# Patient Record
Sex: FEMALE | Race: WHITE | URBAN - METROPOLITAN AREA
[De-identification: names, ages, dates, MRNs, and addresses within clinical notes are randomized per-mention and may not be internally consistent; named-entity substitution may affect disease eponyms.]

---

## 2017-01-25 ENCOUNTER — HISTORICAL (OUTPATIENT)
Dept: LAB | Facility: HOSPITAL | Age: 72
End: 2017-01-25

## 2017-03-09 ENCOUNTER — HISTORICAL (OUTPATIENT)
Dept: ADMINISTRATIVE | Facility: HOSPITAL | Age: 72
End: 2017-03-09

## 2017-04-03 ENCOUNTER — HISTORICAL (OUTPATIENT)
Dept: LAB | Facility: HOSPITAL | Age: 72
End: 2017-04-03

## 2017-05-11 ENCOUNTER — HISTORICAL (OUTPATIENT)
Dept: LAB | Facility: HOSPITAL | Age: 72
End: 2017-05-11

## 2017-05-11 LAB
ABS NEUT (OLG): 4.2 X10(3)/MCL (ref 1.5–6.9)
ALBUMIN SERPL-MCNC: 3.7 GM/DL (ref 3.4–5)
ALBUMIN/GLOB SERPL: 1 RATIO
ALP SERPL-CCNC: 58 UNIT/L (ref 30–113)
ALT SERPL-CCNC: 31 UNIT/L (ref 10–45)
AST SERPL-CCNC: 24 UNIT/L (ref 15–37)
BASOPHILS # BLD AUTO: 0.3 X10(3)/MCL (ref 0–0.1)
BASOPHILS NFR BLD AUTO: 3 % (ref 0–1)
BILIRUB SERPL-MCNC: 0.4 MG/DL (ref 0.1–0.9)
BILIRUBIN DIRECT+TOT PNL SERPL-MCNC: 0.1 MG/DL (ref 0–0.3)
BILIRUBIN DIRECT+TOT PNL SERPL-MCNC: 0.3 MG/DL
BUN SERPL-MCNC: 18 MG/DL (ref 10–20)
CALCIUM SERPL-MCNC: 8.8 MG/DL (ref 8–10.5)
CHLORIDE SERPL-SCNC: 106 MMOL/L (ref 100–108)
CO2 SERPL-SCNC: 30 MMOL/L (ref 21–35)
CREAT SERPL-MCNC: 1.24 MG/DL (ref 0.7–1.3)
EOSINOPHIL # BLD AUTO: 0.7 X10(3)/MCL (ref 0–0.6)
EOSINOPHIL NFR BLD AUTO: 8 % (ref 0–5)
ERYTHROCYTE [DISTWIDTH] IN BLOOD BY AUTOMATED COUNT: 13.1 % (ref 11.5–17)
GLOBULIN SER-MCNC: 3.7 GM/DL
GLUCOSE SERPL-MCNC: 127 MG/DL (ref 75–116)
HCT VFR BLD AUTO: 37.4 % (ref 36–48)
HGB BLD-MCNC: 12.1 GM/DL (ref 12–16)
LYMPHOCYTES # BLD AUTO: 3.4 X10(3)/MCL (ref 0.5–4.1)
LYMPHOCYTES NFR BLD AUTO: 34.4 % (ref 15–40)
MCH RBC QN AUTO: 30 PG (ref 27–34)
MCHC RBC AUTO-ENTMCNC: 32 GM/DL (ref 31–36)
MCV RBC AUTO: 92 FL (ref 80–99)
MONOCYTES # BLD AUTO: 1.2 X10(3)/MCL (ref 0–1.1)
MONOCYTES NFR BLD AUTO: 12 % (ref 4–12)
NEUTROPHILS # BLD AUTO: 4.2 X10(3)/MCL (ref 1.5–6.9)
NEUTROPHILS NFR BLD AUTO: 43 % (ref 43–75)
PLATELET # BLD AUTO: 286 X10(3)/MCL (ref 140–400)
PMV BLD AUTO: 11.6 FL (ref 6.8–10)
POTASSIUM SERPL-SCNC: 4.6 MMOL/L (ref 3.6–5.2)
PROT SERPL-MCNC: 7.4 GM/DL (ref 6.4–8.2)
RBC # BLD AUTO: 4.06 X10(6)/MCL (ref 4.2–5.4)
SODIUM SERPL-SCNC: 142 MMOL/L (ref 135–145)
WBC # SPEC AUTO: 9.9 X10(3)/MCL (ref 4.5–11.5)

## 2017-06-08 ENCOUNTER — HISTORICAL (OUTPATIENT)
Dept: RADIOLOGY | Facility: HOSPITAL | Age: 72
End: 2017-06-08

## 2017-09-01 ENCOUNTER — HISTORICAL (OUTPATIENT)
Dept: LAB | Facility: HOSPITAL | Age: 72
End: 2017-09-01

## 2017-09-01 LAB
ABS NEUT (OLG): 4 X10(3)/MCL (ref 1.5–6.9)
ALBUMIN SERPL-MCNC: 3.4 GM/DL (ref 3.4–5)
BUN SERPL-MCNC: 19 MG/DL (ref 10–20)
CALCIUM SERPL-MCNC: 8.5 MG/DL (ref 8–10.5)
CHLORIDE SERPL-SCNC: 106 MMOL/L (ref 100–108)
CHOLEST SERPL-MCNC: 109 MG/DL (ref 140–200)
CHOLEST/HDLC SERPL: 2 MG/DL (ref 0–8)
CO2 SERPL-SCNC: 28 MMOL/L (ref 21–35)
CREAT SERPL-MCNC: 1.07 MG/DL (ref 0.7–1.3)
ERYTHROCYTE [DISTWIDTH] IN BLOOD BY AUTOMATED COUNT: 12.9 % (ref 11.5–17)
GLUCOSE SERPL-MCNC: 115 MG/DL (ref 75–116)
HCT VFR BLD AUTO: 38.1 % (ref 36–48)
HDLC SERPL-MCNC: 57 MG/DL (ref 35–59)
HGB BLD-MCNC: 12.4 GM/DL (ref 12–16)
LDLC SERPL CALC-MCNC: 45 MG/DL (ref 100–129)
MCH RBC QN AUTO: 30 PG (ref 27–34)
MCHC RBC AUTO-ENTMCNC: 32 GM/DL (ref 31–36)
MCV RBC AUTO: 92 FL (ref 80–99)
PHOSPHATE SERPL-MCNC: 3.6 MG/DL (ref 2.6–4.7)
PLATELET # BLD AUTO: 278 X10(3)/MCL (ref 140–400)
PMV BLD AUTO: 12.2 FL (ref 6.8–10)
POTASSIUM SERPL-SCNC: 3.8 MMOL/L (ref 3.6–5.2)
RBC # BLD AUTO: 4.13 X10(6)/MCL (ref 4.2–5.4)
SODIUM SERPL-SCNC: 143 MMOL/L (ref 135–145)
TRIGL SERPL-MCNC: 48 MG/DL (ref 35–150)
TSH SERPL-ACNC: 2.82 MIU/ML (ref 0.36–3.74)
VLDLC SERPL CALC-MCNC: 10 MG/DL
WBC # SPEC AUTO: 9.5 X10(3)/MCL (ref 4.5–11.5)

## 2017-11-07 ENCOUNTER — HISTORICAL (OUTPATIENT)
Dept: LAB | Facility: HOSPITAL | Age: 72
End: 2017-11-07

## 2017-11-07 LAB
ABS NEUT (OLG): 5.5 X10(3)/MCL (ref 1.5–6.9)
ALBUMIN SERPL-MCNC: 3.4 GM/DL (ref 3.4–5)
ALBUMIN/GLOB SERPL: 0.8 RATIO
ALP SERPL-CCNC: 70 UNIT/L (ref 30–113)
ALT SERPL-CCNC: 31 UNIT/L (ref 10–45)
AST SERPL-CCNC: 29 UNIT/L (ref 15–37)
BILIRUB SERPL-MCNC: 0.3 MG/DL (ref 0.1–0.9)
BILIRUBIN DIRECT+TOT PNL SERPL-MCNC: 0.1 MG/DL (ref 0–0.3)
BILIRUBIN DIRECT+TOT PNL SERPL-MCNC: 0.2 MG/DL
BUN SERPL-MCNC: 20 MG/DL (ref 10–20)
CALCIUM SERPL-MCNC: 9.1 MG/DL (ref 8–10.5)
CHLORIDE SERPL-SCNC: 107 MMOL/L (ref 100–108)
CHOLEST SERPL-MCNC: 108 MG/DL (ref 140–200)
CHOLEST/HDLC SERPL: 2 MG/DL (ref 0–8)
CO2 SERPL-SCNC: 30 MMOL/L (ref 21–35)
CREAT SERPL-MCNC: 1.25 MG/DL (ref 0.7–1.3)
ERYTHROCYTE [DISTWIDTH] IN BLOOD BY AUTOMATED COUNT: 13 % (ref 11.5–17)
EST. AVERAGE GLUCOSE BLD GHB EST-MCNC: 126 MG/DL
GLOBULIN SER-MCNC: 4 GM/DL
GLUCOSE SERPL-MCNC: 131 MG/DL (ref 75–116)
HBA1C MFR BLD: 6 % (ref 4.8–6)
HCT VFR BLD AUTO: 36.7 % (ref 36–48)
HDLC SERPL-MCNC: 57 MG/DL (ref 35–59)
HGB BLD-MCNC: 11.8 GM/DL (ref 12–16)
LDLC SERPL CALC-MCNC: 44 MG/DL (ref 100–129)
MCH RBC QN AUTO: 30 PG (ref 27–34)
MCHC RBC AUTO-ENTMCNC: 32 GM/DL (ref 31–36)
MCV RBC AUTO: 92 FL (ref 80–99)
PLATELET # BLD AUTO: 294 X10(3)/MCL (ref 140–400)
PMV BLD AUTO: 12 FL (ref 6.8–10)
POTASSIUM SERPL-SCNC: 4.5 MMOL/L (ref 3.6–5.2)
PROT SERPL-MCNC: 7.4 GM/DL (ref 6.4–8.2)
RBC # BLD AUTO: 3.97 X10(6)/MCL (ref 4.2–5.4)
SODIUM SERPL-SCNC: 143 MMOL/L (ref 135–145)
T3FREE SERPL-MCNC: 2.57 PG/ML (ref 2.18–3.98)
TRIGL SERPL-MCNC: 80 MG/DL (ref 35–150)
TSH SERPL-ACNC: 3.27 MIU/ML (ref 0.36–3.74)
VLDLC SERPL CALC-MCNC: 16 MG/DL
WBC # SPEC AUTO: 10.9 X10(3)/MCL (ref 4.5–11.5)

## 2018-02-07 ENCOUNTER — HISTORICAL (OUTPATIENT)
Dept: LAB | Facility: HOSPITAL | Age: 73
End: 2018-02-07

## 2018-02-07 LAB
ALBUMIN SERPL-MCNC: 3.5 GM/DL (ref 3.4–5)
BUN SERPL-MCNC: 24 MG/DL (ref 10–20)
CALCIUM SERPL-MCNC: 9.5 MG/DL (ref 8–10.5)
CHLORIDE SERPL-SCNC: 107 MMOL/L (ref 100–108)
CO2 SERPL-SCNC: 30 MMOL/L (ref 21–35)
CREAT SERPL-MCNC: 1.18 MG/DL (ref 0.7–1.3)
CREAT UR-MCNC: 80.8 MG/DL
GLUCOSE SERPL-MCNC: 119 MG/DL (ref 75–116)
PHOSPHATE SERPL-MCNC: 3.8 MG/DL (ref 2.6–4.7)
POTASSIUM SERPL-SCNC: 4.1 MMOL/L (ref 3.6–5.2)
PROT UR STRIP-MCNC: 14 MG/DL (ref 0–10)
SODIUM SERPL-SCNC: 144 MMOL/L (ref 135–145)

## 2018-03-08 ENCOUNTER — HISTORICAL (OUTPATIENT)
Dept: LAB | Facility: HOSPITAL | Age: 73
End: 2018-03-08

## 2018-03-08 LAB
ABS NEUT (OLG): 3.9 X10(3)/MCL (ref 1.5–6.9)
ALBUMIN SERPL-MCNC: 3.6 GM/DL (ref 3.4–5)
ALBUMIN/GLOB SERPL: 0.9 RATIO
ALP SERPL-CCNC: 54 UNIT/L (ref 30–113)
ALT SERPL-CCNC: 28 UNIT/L (ref 10–45)
APPEARANCE, UA: CLEAR
AST SERPL-CCNC: 29 UNIT/L (ref 15–37)
BACTERIA SPEC CULT: ABNORMAL /HPF
BILIRUB SERPL-MCNC: 0.3 MG/DL (ref 0.1–0.9)
BILIRUB UR QL STRIP: NEGATIVE
BILIRUBIN DIRECT+TOT PNL SERPL-MCNC: 0.1 MG/DL (ref 0–0.3)
BILIRUBIN DIRECT+TOT PNL SERPL-MCNC: 0.2 MG/DL
BUN SERPL-MCNC: 24 MG/DL (ref 10–20)
CALCIUM SERPL-MCNC: 9.1 MG/DL (ref 8–10.5)
CHLORIDE SERPL-SCNC: 106 MMOL/L (ref 100–108)
CO2 SERPL-SCNC: 29 MMOL/L (ref 21–35)
COLOR UR: ABNORMAL
CREAT SERPL-MCNC: 1.05 MG/DL (ref 0.7–1.3)
CREAT UR-MCNC: 88.2 MG/DL
ERYTHROCYTE [DISTWIDTH] IN BLOOD BY AUTOMATED COUNT: 12.9 % (ref 11.5–17)
FERRITIN SERPL-MCNC: 387 NG/ML (ref 3–252)
FOLATE SERPL-MCNC: 20 NG/ML (ref 8.6–58.9)
GLOBULIN SER-MCNC: 4 GM/DL
GLUCOSE (UA): NEGATIVE
GLUCOSE SERPL-MCNC: 108 MG/DL (ref 75–116)
HCT VFR BLD AUTO: 37.4 % (ref 36–48)
HGB BLD-MCNC: 11.8 GM/DL (ref 12–16)
HGB UR QL STRIP: NEGATIVE
IRON SATN MFR SERPL: 30 % (ref 15–55)
IRON SERPL-MCNC: 71 MCG/DL (ref 50–170)
KETONES UR QL STRIP: NEGATIVE
LEUKOCYTE ESTERASE UR QL STRIP: ABNORMAL
MCH RBC QN AUTO: 30 PG (ref 27–34)
MCHC RBC AUTO-ENTMCNC: 32 GM/DL (ref 31–36)
MCV RBC AUTO: 94 FL (ref 80–99)
MICROALBUMIN UR-MCNC: 1.1 MG/DL (ref 0.1–2)
MICROALBUMIN/CREAT RATIO PNL UR: 12.5 MG/GM CR (ref 0–30)
NITRITE UR QL STRIP: NEGATIVE
PH UR STRIP: 6 [PH]
PLATELET # BLD AUTO: 287 X10(3)/MCL (ref 140–400)
PMV BLD AUTO: 12.2 FL (ref 6.8–10)
POTASSIUM SERPL-SCNC: 3.9 MMOL/L (ref 3.6–5.2)
PROT SERPL-MCNC: 7.6 GM/DL (ref 6.4–8.2)
PROT UR QL STRIP: NEGATIVE
PROT UR STRIP-MCNC: 17 MG/DL (ref 0–10)
RBC # BLD AUTO: 3.98 X10(6)/MCL (ref 4.2–5.4)
RBC #/AREA URNS HPF: ABNORMAL /HPF
SODIUM SERPL-SCNC: 144 MMOL/L (ref 135–145)
SP GR UR STRIP: 1.02
SQUAMOUS EPITHELIAL, UA: ABNORMAL /LPF
T3FREE SERPL-MCNC: 2.93 PG/ML (ref 2.18–3.98)
TIBC SERPL-MCNC: 163 MCG/DL (ref 150–375)
TIBC SERPL-MCNC: 234 MCG/DL (ref 250–450)
TSH SERPL-ACNC: 3.28 MIU/ML (ref 0.36–3.74)
UROBILINOGEN UR STRIP-ACNC: 0.2 EU/DL
VIT B12 SERPL-MCNC: 1165 PG/ML (ref 193–986)
WBC # SPEC AUTO: 10.4 X10(3)/MCL (ref 4.5–11.5)
WBC #/AREA URNS HPF: ABNORMAL /HPF

## 2018-07-16 ENCOUNTER — HISTORICAL (OUTPATIENT)
Dept: LAB | Facility: HOSPITAL | Age: 73
End: 2018-07-16

## 2018-07-16 LAB
ABS NEUT (OLG): 4.1 X10(3)/MCL (ref 1.5–6.9)
ALBUMIN SERPL-MCNC: 3.6 GM/DL (ref 3.4–5)
BUN SERPL-MCNC: 33 MG/DL (ref 10–20)
CALCIUM SERPL-MCNC: 8.9 MG/DL (ref 8–10.5)
CHLORIDE SERPL-SCNC: 107 MMOL/L (ref 100–108)
CO2 SERPL-SCNC: 26 MMOL/L (ref 21–35)
CREAT SERPL-MCNC: 1.35 MG/DL (ref 0.7–1.3)
CREAT UR-MCNC: 109.7 MG/DL
ERYTHROCYTE [DISTWIDTH] IN BLOOD BY AUTOMATED COUNT: 12.8 % (ref 11.5–17)
GLUCOSE SERPL-MCNC: 113 MG/DL (ref 75–116)
HCT VFR BLD AUTO: 37.4 % (ref 36–48)
HGB BLD-MCNC: 11.9 GM/DL (ref 12–16)
MCH RBC QN AUTO: 30 PG (ref 27–34)
MCHC RBC AUTO-ENTMCNC: 32 GM/DL (ref 31–36)
MCV RBC AUTO: 94 FL (ref 80–99)
PHOSPHATE SERPL-MCNC: 3.5 MG/DL (ref 2.6–4.7)
PLATELET # BLD AUTO: 278 X10(3)/MCL (ref 140–400)
PMV BLD AUTO: 12.5 FL (ref 6.8–10)
POTASSIUM SERPL-SCNC: 3.8 MMOL/L (ref 3.6–5.2)
PROT UR STRIP-MCNC: 22 MG/DL (ref 0–10)
RBC # BLD AUTO: 3.99 X10(6)/MCL (ref 4.2–5.4)
SODIUM SERPL-SCNC: 142 MMOL/L (ref 135–145)
WBC # SPEC AUTO: 10.6 X10(3)/MCL (ref 4.5–11.5)

## 2018-11-29 ENCOUNTER — HISTORICAL (OUTPATIENT)
Dept: LAB | Facility: HOSPITAL | Age: 73
End: 2018-11-29

## 2018-11-29 LAB
ABS NEUT (OLG): 4 X10(3)/MCL (ref 1.5–6.9)
ALBUMIN SERPL-MCNC: 3.5 GM/DL (ref 3.4–5)
BUN SERPL-MCNC: 23 MG/DL (ref 10–20)
CALCIUM SERPL-MCNC: 9.2 MG/DL (ref 8–10.5)
CHLORIDE SERPL-SCNC: 103 MMOL/L (ref 100–108)
CO2 SERPL-SCNC: 28 MMOL/L (ref 21–35)
CREAT SERPL-MCNC: 1.17 MG/DL (ref 0.7–1.3)
CREAT UR-MCNC: 47.3 MG/DL
ERYTHROCYTE [DISTWIDTH] IN BLOOD BY AUTOMATED COUNT: 13.2 % (ref 11.5–17)
GLUCOSE SERPL-MCNC: 104 MG/DL (ref 75–116)
HCT VFR BLD AUTO: 35.8 % (ref 36–48)
HGB BLD-MCNC: 11.3 GM/DL (ref 12–16)
MCH RBC QN AUTO: 30 PG (ref 27–34)
MCHC RBC AUTO-ENTMCNC: 32 GM/DL (ref 31–36)
MCV RBC AUTO: 95 FL (ref 80–99)
PHOSPHATE SERPL-MCNC: 3.8 MG/DL (ref 2.6–4.7)
PLATELET # BLD AUTO: 309 X10(3)/MCL (ref 140–400)
PMV BLD AUTO: 12 FL (ref 6.8–10)
POTASSIUM SERPL-SCNC: 4.5 MMOL/L (ref 3.6–5.2)
PROT UR STRIP-MCNC: 12 MG/DL (ref 0–10)
RBC # BLD AUTO: 3.77 X10(6)/MCL (ref 4.2–5.4)
SODIUM SERPL-SCNC: 138 MMOL/L (ref 135–145)
WBC # SPEC AUTO: 10 X10(3)/MCL (ref 4.5–11.5)

## 2019-09-17 ENCOUNTER — HISTORICAL (OUTPATIENT)
Dept: LAB | Facility: HOSPITAL | Age: 74
End: 2019-09-17

## 2019-09-17 LAB
ABS NEUT (OLG): 5.2 X10(3)/MCL (ref 1.5–6.9)
ALBUMIN SERPL-MCNC: 3.3 GM/DL (ref 3.4–5)
APPEARANCE, UA: CLEAR
BACTERIA SPEC CULT: ABNORMAL /HPF
BILIRUB UR QL STRIP: NEGATIVE
BUN SERPL-MCNC: 21 MG/DL (ref 10–20)
CALCIUM SERPL-MCNC: 9.2 MG/DL (ref 8–10.5)
CHLORIDE SERPL-SCNC: 108 MMOL/L (ref 100–108)
CO2 SERPL-SCNC: 30 MMOL/L (ref 21–35)
COLOR UR: ABNORMAL
CREAT SERPL-MCNC: 1.3 MG/DL (ref 0.7–1.3)
CREAT UR-MCNC: 32.2 MG/DL
ERYTHROCYTE [DISTWIDTH] IN BLOOD BY AUTOMATED COUNT: 12.9 % (ref 11.5–17)
GLUCOSE (UA): NEGATIVE
GLUCOSE SERPL-MCNC: 98 MG/DL (ref 75–116)
HCT VFR BLD AUTO: 38.4 % (ref 36–48)
HGB BLD-MCNC: 12.1 GM/DL (ref 12–16)
HGB UR QL STRIP: ABNORMAL
KETONES UR QL STRIP: NEGATIVE
LEUKOCYTE ESTERASE UR QL STRIP: ABNORMAL
MCH RBC QN AUTO: 30 PG (ref 27–34)
MCHC RBC AUTO-ENTMCNC: 32 GM/DL (ref 31–36)
MCV RBC AUTO: 96 FL (ref 80–99)
NITRITE UR QL STRIP: POSITIVE
PH UR STRIP: 6 [PH]
PHOSPHATE SERPL-MCNC: 3.6 MG/DL (ref 2.6–4.7)
PLATELET # BLD AUTO: 244 X10(3)/MCL (ref 140–400)
PMV BLD AUTO: 12.8 FL (ref 6.8–10)
POTASSIUM SERPL-SCNC: 4.5 MMOL/L (ref 3.6–5.2)
PROT UR QL STRIP: NEGATIVE
PROT UR STRIP-MCNC: 10 MG/DL (ref 0–10)
RBC # BLD AUTO: 4.02 X10(6)/MCL (ref 4.2–5.4)
RBC #/AREA URNS HPF: ABNORMAL /[HPF]
SODIUM SERPL-SCNC: 145 MMOL/L (ref 135–145)
SP GR UR STRIP: <=1.005
SQUAMOUS EPITHELIAL, UA: ABNORMAL /LPF
UROBILINOGEN UR STRIP-ACNC: 0.2 EU/DL
WBC # SPEC AUTO: 11.7 X10(3)/MCL (ref 4.5–11.5)
WBC #/AREA URNS HPF: ABNORMAL /HPF

## 2019-10-16 ENCOUNTER — HISTORICAL (OUTPATIENT)
Dept: RADIOLOGY | Facility: HOSPITAL | Age: 74
End: 2019-10-16

## 2019-11-08 ENCOUNTER — HISTORICAL (OUTPATIENT)
Dept: RADIOLOGY | Facility: HOSPITAL | Age: 74
End: 2019-11-08

## 2019-11-16 ENCOUNTER — HISTORICAL (OUTPATIENT)
Dept: ADMINISTRATIVE | Facility: HOSPITAL | Age: 74
End: 2019-11-16

## 2019-11-16 LAB
ABS NEUT (OLG): 6.4 X10(3)/MCL (ref 1.5–6.9)
ALBUMIN SERPL-MCNC: 2.4 GM/DL (ref 3.4–5)
ALBUMIN/GLOB SERPL: 0.6 RATIO
ALP SERPL-CCNC: 73 UNIT/L (ref 30–113)
ALT SERPL-CCNC: 69 UNIT/L (ref 10–45)
AST SERPL-CCNC: 58 UNIT/L (ref 15–37)
BASOPHILS # BLD AUTO: 0.2 X10(3)/MCL (ref 0–0.1)
BASOPHILS NFR BLD AUTO: 1 % (ref 0–1)
BILIRUB SERPL-MCNC: 0.3 MG/DL (ref 0.1–0.9)
BILIRUBIN DIRECT+TOT PNL SERPL-MCNC: 0.1 MG/DL (ref 0–0.3)
BILIRUBIN DIRECT+TOT PNL SERPL-MCNC: 0.2 MG/DL
BUN SERPL-MCNC: 26 MG/DL (ref 10–20)
CALCIUM SERPL-MCNC: 7.1 MG/DL (ref 8–10.5)
CHLORIDE SERPL-SCNC: 108 MMOL/L (ref 100–108)
CO2 SERPL-SCNC: 28 MMOL/L (ref 21–35)
CREAT SERPL-MCNC: 0.86 MG/DL (ref 0.7–1.3)
EOSINOPHIL # BLD AUTO: 0.7 X10(3)/MCL (ref 0–0.6)
EOSINOPHIL NFR BLD AUTO: 6 % (ref 0–5)
ERYTHROCYTE [DISTWIDTH] IN BLOOD BY AUTOMATED COUNT: 13.3 % (ref 11.5–17)
EST. AVERAGE GLUCOSE BLD GHB EST-MCNC: 120 MG/DL
GLOBULIN SER-MCNC: 4.1 GM/DL
GLUCOSE SERPL-MCNC: 112 MG/DL (ref 75–116)
HBA1C MFR BLD: 5.8 % (ref 4.8–6)
HCT VFR BLD AUTO: 37.2 % (ref 36–48)
HGB BLD-MCNC: 11.4 GM/DL (ref 12–16)
IMM GRANULOCYTES # BLD AUTO: 0.15 10*3/UL (ref 0–0.02)
IMM GRANULOCYTES NFR BLD AUTO: 1.3 % (ref 0–0.43)
LYMPHOCYTES # BLD AUTO: 2.5 X10(3)/MCL (ref 0.5–4.1)
LYMPHOCYTES NFR BLD AUTO: 22 % (ref 15–40)
MCH RBC QN AUTO: 29 PG (ref 27–34)
MCHC RBC AUTO-ENTMCNC: 31 GM/DL (ref 31–36)
MCV RBC AUTO: 95 FL (ref 80–99)
MONOCYTES # BLD AUTO: 1.6 X10(3)/MCL (ref 0–1.1)
MONOCYTES NFR BLD AUTO: 14 % (ref 4–12)
NEUTROPHILS # BLD AUTO: 6.4 X10(3)/MCL (ref 1.5–6.9)
NEUTROPHILS NFR BLD AUTO: 56 % (ref 43–75)
PLATELET # BLD AUTO: 487 X10(3)/MCL (ref 140–400)
PMV BLD AUTO: 12.4 FL (ref 6.8–10)
POTASSIUM SERPL-SCNC: 5.2 MMOL/L (ref 3.6–5.2)
PROT SERPL-MCNC: 6.5 GM/DL (ref 6.4–8.2)
RBC # BLD AUTO: 3.93 X10(6)/MCL (ref 4.2–5.4)
SODIUM SERPL-SCNC: 145 MMOL/L (ref 135–145)
TSH SERPL-ACNC: 2.82 MIU/ML (ref 0.36–3.74)
WBC # SPEC AUTO: 11.6 X10(3)/MCL (ref 4.5–11.5)

## 2020-06-29 ENCOUNTER — HISTORICAL (OUTPATIENT)
Dept: LAB | Facility: HOSPITAL | Age: 75
End: 2020-06-29

## 2020-06-29 LAB
ABS NEUT (OLG): 4.4 X10(3)/MCL (ref 1.5–6.9)
ALBUMIN SERPL-MCNC: 3.4 GM/DL (ref 3.4–4.8)
ALBUMIN/GLOB SERPL: 0.9 RATIO (ref 1.1–2)
ALP SERPL-CCNC: 62 UNIT/L (ref 40–150)
ALT SERPL-CCNC: 21 UNIT/L (ref 0–55)
AST SERPL-CCNC: 21 UNIT/L (ref 5–34)
BILIRUB SERPL-MCNC: 0.6 MG/DL
BILIRUBIN DIRECT+TOT PNL SERPL-MCNC: 0.2 MG/DL (ref 0–0.5)
BILIRUBIN DIRECT+TOT PNL SERPL-MCNC: 0.4 MG/DL (ref 0–0.8)
BUN SERPL-MCNC: 17 MG/DL (ref 9.8–20.1)
CALCIUM SERPL-MCNC: 9.2 MG/DL (ref 8.4–10.2)
CHLORIDE SERPL-SCNC: 109 MMOL/L (ref 98–107)
CHOLEST SERPL-MCNC: 126 MG/DL
CHOLEST/HDLC SERPL: 3 {RATIO} (ref 0–5)
CO2 SERPL-SCNC: 25 MMOL/L (ref 23–31)
CREAT SERPL-MCNC: 1.2 MG/DL (ref 0.55–1.02)
ERYTHROCYTE [DISTWIDTH] IN BLOOD BY AUTOMATED COUNT: 14.4 % (ref 11.5–17)
EST. AVERAGE GLUCOSE BLD GHB EST-MCNC: 99.7 MG/DL
GLOBULIN SER-MCNC: 3.6 GM/DL (ref 2.4–3.5)
GLUCOSE SERPL-MCNC: 87 MG/DL (ref 82–115)
HBA1C MFR BLD: 5.1 %
HCT VFR BLD AUTO: 39.4 % (ref 36–48)
HDLC SERPL-MCNC: 45 MG/DL (ref 35–60)
HGB BLD-MCNC: 12.4 GM/DL (ref 12–16)
LDLC SERPL CALC-MCNC: 62 MG/DL (ref 50–140)
MCH RBC QN AUTO: 30 PG (ref 27–34)
MCHC RBC AUTO-ENTMCNC: 32 GM/DL (ref 31–36)
MCV RBC AUTO: 94 FL (ref 80–99)
PLATELET # BLD AUTO: 276 X10(3)/MCL (ref 140–400)
PMV BLD AUTO: ABNORMAL FL (ref 6.8–10)
POTASSIUM SERPL-SCNC: 4.3 MMOL/L (ref 3.5–5.1)
PROT SERPL-MCNC: 7 GM/DL (ref 5.8–7.6)
RBC # BLD AUTO: 4.21 X10(6)/MCL (ref 4.2–5.4)
SODIUM SERPL-SCNC: 141 MMOL/L (ref 136–145)
TRIGL SERPL-MCNC: 97 MG/DL (ref 37–140)
TSH SERPL-ACNC: 5.95 UIU/ML (ref 0.35–4.94)
VLDLC SERPL CALC-MCNC: 19 MG/DL
WBC # SPEC AUTO: 11.6 X10(3)/MCL (ref 4.5–11.5)

## 2020-10-26 ENCOUNTER — HISTORICAL (OUTPATIENT)
Dept: LAB | Facility: HOSPITAL | Age: 75
End: 2020-10-26

## 2020-10-26 LAB
ABS NEUT (OLG): 5.3 X10(3)/MCL (ref 1.5–6.9)
ALBUMIN SERPL-MCNC: 3.4 GM/DL (ref 3.4–4.8)
BUN SERPL-MCNC: 15 MG/DL (ref 9.8–20.1)
CALCIUM SERPL-MCNC: 9.3 MG/DL (ref 8.4–10.2)
CHLORIDE SERPL-SCNC: 104 MMOL/L (ref 98–107)
CO2 SERPL-SCNC: 26 MMOL/L (ref 23–31)
CREAT SERPL-MCNC: 1.09 MG/DL (ref 0.55–1.02)
ERYTHROCYTE [DISTWIDTH] IN BLOOD BY AUTOMATED COUNT: 13.8 % (ref 11.5–17)
GLUCOSE SERPL-MCNC: 101 MG/DL (ref 82–115)
HCT VFR BLD AUTO: 38.9 % (ref 36–48)
HGB BLD-MCNC: 12.4 GM/DL (ref 12–16)
MCH RBC QN AUTO: 30 PG (ref 27–34)
MCHC RBC AUTO-ENTMCNC: 32 GM/DL (ref 31–36)
MCV RBC AUTO: 95 FL (ref 80–99)
PHOSPHATE SERPL-MCNC: 3.5 MG/DL (ref 2.3–4.7)
PLATELET # BLD AUTO: 334 X10(3)/MCL (ref 140–400)
PMV BLD AUTO: 12.9 FL (ref 6.8–10)
POTASSIUM SERPL-SCNC: 3.8 MMOL/L (ref 3.5–5.1)
RBC # BLD AUTO: 4.11 X10(6)/MCL (ref 4.2–5.4)
SODIUM SERPL-SCNC: 143 MMOL/L (ref 136–145)
WBC # SPEC AUTO: 9.6 X10(3)/MCL (ref 4.5–11.5)

## 2020-11-25 LAB
ABS NEUT (OLG): 6.84 X10(3)/MCL (ref 2.1–9.2)
ALBUMIN SERPL-MCNC: 2.9 GM/DL (ref 3.4–4.8)
ALBUMIN/GLOB SERPL: 0.7 RATIO (ref 1.1–2)
ALP SERPL-CCNC: 59 UNIT/L (ref 40–150)
ALT SERPL-CCNC: 20 UNIT/L (ref 0–55)
AST SERPL-CCNC: 37 UNIT/L (ref 5–34)
BASOPHILS # BLD AUTO: 0.24 X10(3)/MCL (ref 0–0.2)
BASOPHILS NFR BLD AUTO: 1.9 % (ref 0–1)
BILIRUB SERPL-MCNC: 0.3 MG/DL (ref 0.2–1.2)
BILIRUBIN DIRECT+TOT PNL SERPL-MCNC: 0.1 MG/DL (ref 0–0.8)
BILIRUBIN DIRECT+TOT PNL SERPL-MCNC: 0.2 MG/DL (ref 0–0.5)
BUN SERPL-MCNC: 23.5 MG/DL (ref 9.8–20.1)
CALCIUM SERPL-MCNC: 9.6 MG/DL (ref 8.4–10.2)
CHLORIDE SERPL-SCNC: 102 MMOL/L (ref 98–107)
CO2 SERPL-SCNC: 30 MMOL/L (ref 23–31)
CREAT SERPL-MCNC: 0.93 MG/DL (ref 0.57–1.11)
EOSINOPHIL # BLD AUTO: 1.19 X10(3)/MCL (ref 0–0.9)
EOSINOPHIL NFR BLD AUTO: 9.7 % (ref 0–6.4)
ERYTHROCYTE [DISTWIDTH] IN BLOOD BY AUTOMATED COUNT: 14.3 % (ref 11.5–17)
GLOBULIN SER-MCNC: 4 GM/DL (ref 2.4–3.5)
GLUCOSE SERPL-MCNC: 125 MG/DL (ref 82–115)
HCT VFR BLD AUTO: 39.2 % (ref 37–47)
HGB BLD-MCNC: 12 GM/DL (ref 12–16)
IMM GRANULOCYTES # BLD AUTO: 0.09 10*3/UL (ref 0–0.02)
IMM GRANULOCYTES NFR BLD AUTO: 0.7 % (ref 0–0.43)
LYMPHOCYTES # BLD AUTO: 2.63 X10(3)/MCL (ref 0.6–4.6)
LYMPHOCYTES NFR BLD AUTO: 21.3 % (ref 16–44)
MAGNESIUM SERPL-MCNC: 2.02 MG/DL (ref 1.6–2.6)
MCH RBC QN AUTO: 28.4 PG (ref 27–31)
MCHC RBC AUTO-ENTMCNC: 30.6 GM/DL (ref 33–36)
MCV RBC AUTO: 92.9 FL (ref 80–94)
MONOCYTES # BLD AUTO: 1.33 X10(3)/MCL (ref 0.1–1.3)
MONOCYTES NFR BLD AUTO: 10.8 % (ref 4–12.1)
NEUTROPHILS # BLD AUTO: 6.84 X10(3)/MCL (ref 2.1–9.2)
NEUTROPHILS NFR BLD AUTO: 55.6 % (ref 43–73)
NRBC BLD AUTO-RTO: 0 % (ref 0–0.2)
PHOSPHATE SERPL-MCNC: 3.3 MG/DL (ref 2.3–4.7)
PLATELET # BLD AUTO: 452 X10(3)/MCL (ref 130–400)
PMV BLD AUTO: 12.5 FL (ref 7.4–10.4)
POTASSIUM SERPL-SCNC: 4 MMOL/L (ref 3.5–5.1)
PREALB SERPL-MCNC: 16.6 MG/DL (ref 14–37)
PROT SERPL-MCNC: 6.9 GM/DL (ref 5.8–7.6)
RBC # BLD AUTO: 4.22 X10(6)/MCL (ref 4.2–5.4)
SODIUM SERPL-SCNC: 143 MMOL/L (ref 136–145)
WBC # SPEC AUTO: 12.3 X10(3)/MCL (ref 4.5–11.5)

## 2020-11-30 LAB
ABS NEUT (OLG): 7.75 X10(3)/MCL (ref 2.1–9.2)
ALBUMIN SERPL-MCNC: 2.8 GM/DL (ref 3.4–4.8)
ALBUMIN/GLOB SERPL: 0.7 RATIO (ref 1.1–2)
ALP SERPL-CCNC: 73 UNIT/L (ref 40–150)
ALT SERPL-CCNC: 41 UNIT/L (ref 0–55)
APPEARANCE, UA: ABNORMAL
AST SERPL-CCNC: 51 UNIT/L (ref 5–34)
BACTERIA SPEC CULT: ABNORMAL /HPF
BILIRUB SERPL-MCNC: 0.3 MG/DL (ref 0.2–1.2)
BILIRUB UR QL STRIP: NEGATIVE
BILIRUBIN DIRECT+TOT PNL SERPL-MCNC: 0.1 MG/DL (ref 0–0.8)
BILIRUBIN DIRECT+TOT PNL SERPL-MCNC: 0.2 MG/DL (ref 0–0.5)
BUN SERPL-MCNC: 33.6 MG/DL (ref 9.8–20.1)
CALCIUM SERPL-MCNC: 9.6 MG/DL (ref 8.4–10.2)
CHLORIDE SERPL-SCNC: 102 MMOL/L (ref 98–107)
CO2 SERPL-SCNC: 31 MMOL/L (ref 23–31)
COLOR UR: ABNORMAL
CREAT SERPL-MCNC: 0.94 MG/DL (ref 0.57–1.11)
EOSINOPHIL NFR BLD MANUAL: 5 % (ref 0–8)
ERYTHROCYTE [DISTWIDTH] IN BLOOD BY AUTOMATED COUNT: 14.4 % (ref 11.5–17)
GLOBULIN SER-MCNC: 3.8 GM/DL (ref 2.4–3.5)
GLUCOSE (UA): NEGATIVE
GLUCOSE SERPL-MCNC: 133 MG/DL (ref 82–115)
GRANULOCYTES NFR BLD MANUAL: 55 % (ref 47–80)
HCT VFR BLD AUTO: 36.5 % (ref 37–47)
HGB BLD-MCNC: 11.5 GM/DL (ref 12–16)
HGB UR QL STRIP: NEGATIVE
KETONES UR QL STRIP: NEGATIVE
LEUKOCYTE ESTERASE UR QL STRIP: ABNORMAL
LYMPHOCYTES NFR BLD MANUAL: 27 % (ref 13–40)
MCH RBC QN AUTO: 28.9 PG (ref 27–31)
MCHC RBC AUTO-ENTMCNC: 31.5 GM/DL (ref 33–36)
MCV RBC AUTO: 91.7 FL (ref 80–94)
MONOCYTES NFR BLD MANUAL: 13 % (ref 2–11)
NITRITE UR QL STRIP: NEGATIVE
PH UR STRIP: 7 [PH] (ref 5–7)
PLATELET # BLD AUTO: 390 X10(3)/MCL (ref 130–400)
PLATELET # BLD EST: ADEQUATE 10*3/UL
PMV BLD AUTO: 12.7 FL (ref 7.4–10.4)
POTASSIUM SERPL-SCNC: 3.9 MMOL/L (ref 3.5–5.1)
PREALB SERPL-MCNC: 20.6 MG/DL (ref 14–37)
PROT SERPL-MCNC: 6.6 GM/DL (ref 5.8–7.6)
PROT UR QL STRIP: ABNORMAL
RBC # BLD AUTO: 3.98 X10(6)/MCL (ref 4.2–5.4)
RBC #/AREA URNS HPF: ABNORMAL /HPF
SODIUM SERPL-SCNC: 141 MMOL/L (ref 136–145)
SP GR UR STRIP: 1.01 (ref 1–1.03)
SQUAMOUS EPITHELIAL, UA: ABNORMAL /LPF
UROBILINOGEN UR STRIP-ACNC: NEGATIVE
WBC # SPEC AUTO: 15.8 X10(3)/MCL (ref 4.5–11.5)
WBC #/AREA URNS HPF: ABNORMAL /HPF

## 2020-12-03 LAB
ABS NEUT (OLG): 7.32 X10(3)/MCL (ref 2.1–9.2)
BASOPHILS # BLD AUTO: 0.29 X10(3)/MCL (ref 0–0.2)
BASOPHILS NFR BLD AUTO: 2 % (ref 0–1)
BUN SERPL-MCNC: 31.3 MG/DL (ref 9.8–20.1)
CALCIUM SERPL-MCNC: 10.4 MG/DL (ref 8.4–10.2)
CHLORIDE SERPL-SCNC: 98 MMOL/L (ref 98–107)
CO2 SERPL-SCNC: 32 MMOL/L (ref 23–31)
CREAT SERPL-MCNC: 0.9 MG/DL (ref 0.57–1.11)
CREAT/UREA NIT SERPL: 35
EOSINOPHIL # BLD AUTO: 0.94 X10(3)/MCL (ref 0–0.9)
EOSINOPHIL NFR BLD AUTO: 6.4 % (ref 0–6.4)
ERYTHROCYTE [DISTWIDTH] IN BLOOD BY AUTOMATED COUNT: 14.3 % (ref 11.5–17)
GLUCOSE SERPL-MCNC: 117 MG/DL (ref 82–115)
HCT VFR BLD AUTO: 41.5 % (ref 37–47)
HGB BLD-MCNC: 12.7 GM/DL (ref 12–16)
IMM GRANULOCYTES # BLD AUTO: 0.09 10*3/UL (ref 0–0.02)
IMM GRANULOCYTES NFR BLD AUTO: 0.6 % (ref 0–0.43)
LYMPHOCYTES # BLD AUTO: 4.16 X10(3)/MCL (ref 0.6–4.6)
LYMPHOCYTES NFR BLD AUTO: 28.3 % (ref 16–44)
MCH RBC QN AUTO: 28.6 PG (ref 27–31)
MCHC RBC AUTO-ENTMCNC: 30.6 GM/DL (ref 33–36)
MCV RBC AUTO: 93.5 FL (ref 80–94)
MONOCYTES # BLD AUTO: 1.91 X10(3)/MCL (ref 0.1–1.3)
MONOCYTES NFR BLD AUTO: 13 % (ref 4–12.1)
NEUTROPHILS # BLD AUTO: 7.32 X10(3)/MCL (ref 2.1–9.2)
NEUTROPHILS NFR BLD AUTO: 49.7 % (ref 43–73)
NRBC BLD AUTO-RTO: 0 % (ref 0–0.2)
PLATELET # BLD AUTO: 328 X10(3)/MCL (ref 130–400)
PMV BLD AUTO: 13.3 FL (ref 7.4–10.4)
POTASSIUM SERPL-SCNC: 3.4 MMOL/L (ref 3.5–5.1)
RBC # BLD AUTO: 4.44 X10(6)/MCL (ref 4.2–5.4)
SODIUM SERPL-SCNC: 142 MMOL/L (ref 136–145)
WBC # SPEC AUTO: 14.7 X10(3)/MCL (ref 4.5–11.5)

## 2020-12-04 LAB
ABS NEUT (OLG): 5.78 X10(3)/MCL (ref 2.1–9.2)
ALBUMIN SERPL-MCNC: 2.9 GM/DL (ref 3.4–4.8)
ALBUMIN/GLOB SERPL: 0.8 RATIO (ref 1.1–2)
ALP SERPL-CCNC: 67 UNIT/L (ref 40–150)
ALT SERPL-CCNC: 44 UNIT/L (ref 0–55)
AST SERPL-CCNC: 46 UNIT/L (ref 5–34)
BASOPHILS # BLD AUTO: 0.26 X10(3)/MCL (ref 0–0.2)
BASOPHILS NFR BLD AUTO: 2.3 % (ref 0–1)
BILIRUB SERPL-MCNC: 0.3 MG/DL (ref 0.2–1.2)
BILIRUBIN DIRECT+TOT PNL SERPL-MCNC: 0.1 MG/DL (ref 0–0.5)
BILIRUBIN DIRECT+TOT PNL SERPL-MCNC: 0.2 MG/DL (ref 0–0.8)
BUN SERPL-MCNC: 31.2 MG/DL (ref 9.8–20.1)
CALCIUM SERPL-MCNC: 9.5 MG/DL (ref 8.4–10.2)
CHLORIDE SERPL-SCNC: 106 MMOL/L (ref 98–107)
CO2 SERPL-SCNC: 29 MMOL/L (ref 23–31)
CREAT SERPL-MCNC: 0.78 MG/DL (ref 0.57–1.11)
EOSINOPHIL # BLD AUTO: 0.84 X10(3)/MCL (ref 0–0.9)
EOSINOPHIL NFR BLD AUTO: 7.3 % (ref 0–6.4)
ERYTHROCYTE [DISTWIDTH] IN BLOOD BY AUTOMATED COUNT: 14.5 % (ref 11.5–17)
GLOBULIN SER-MCNC: 3.7 GM/DL (ref 2.4–3.5)
GLUCOSE SERPL-MCNC: 131 MG/DL (ref 82–115)
HCT VFR BLD AUTO: 37.4 % (ref 37–47)
HGB BLD-MCNC: 11.5 GM/DL (ref 12–16)
IMM GRANULOCYTES # BLD AUTO: 0.09 10*3/UL (ref 0–0.02)
IMM GRANULOCYTES NFR BLD AUTO: 0.8 % (ref 0–0.43)
LYMPHOCYTES # BLD AUTO: 2.92 X10(3)/MCL (ref 0.6–4.6)
LYMPHOCYTES NFR BLD AUTO: 25.3 % (ref 16–44)
MAGNESIUM SERPL-MCNC: 1.85 MG/DL (ref 1.6–2.6)
MCH RBC QN AUTO: 29.2 PG (ref 27–31)
MCHC RBC AUTO-ENTMCNC: 30.7 GM/DL (ref 33–36)
MCV RBC AUTO: 94.9 FL (ref 80–94)
MONOCYTES # BLD AUTO: 1.65 X10(3)/MCL (ref 0.1–1.3)
MONOCYTES NFR BLD AUTO: 14.3 % (ref 4–12.1)
NEUTROPHILS # BLD AUTO: 5.78 X10(3)/MCL (ref 2.1–9.2)
NEUTROPHILS NFR BLD AUTO: 50 % (ref 43–73)
NRBC BLD AUTO-RTO: 0 % (ref 0–0.2)
PLATELET # BLD AUTO: 276 X10(3)/MCL (ref 130–400)
PMV BLD AUTO: 14.2 FL (ref 7.4–10.4)
POTASSIUM SERPL-SCNC: 4.5 MMOL/L (ref 3.5–5.1)
PROT SERPL-MCNC: 6.6 GM/DL (ref 5.8–7.6)
RBC # BLD AUTO: 3.94 X10(6)/MCL (ref 4.2–5.4)
SODIUM SERPL-SCNC: 142 MMOL/L (ref 136–145)
WBC # SPEC AUTO: 11.5 X10(3)/MCL (ref 4.5–11.5)

## 2020-12-07 ENCOUNTER — HISTORICAL (OUTPATIENT)
Dept: ADMINISTRATIVE | Facility: HOSPITAL | Age: 75
End: 2020-12-07

## 2020-12-07 LAB
ABS NEUT (OLG): 9.64 X10(3)/MCL (ref 2.1–9.2)
ALBUMIN SERPL-MCNC: 3 GM/DL (ref 3.4–4.8)
ALBUMIN/GLOB SERPL: 0.8 RATIO (ref 1.1–2)
ALP SERPL-CCNC: 75 UNIT/L (ref 40–150)
ALT SERPL-CCNC: 41 UNIT/L (ref 0–55)
APPEARANCE, UA: ABNORMAL
AST SERPL-CCNC: 40 UNIT/L (ref 5–34)
BACTERIA SPEC CULT: ABNORMAL /HPF
BASOPHILS # BLD AUTO: 0.25 X10(3)/MCL (ref 0–0.2)
BASOPHILS NFR BLD AUTO: 1.6 % (ref 0–1)
BILIRUB SERPL-MCNC: 0.3 MG/DL (ref 0.2–1.2)
BILIRUB UR QL STRIP: NEGATIVE
BILIRUBIN DIRECT+TOT PNL SERPL-MCNC: 0.1 MG/DL (ref 0–0.8)
BILIRUBIN DIRECT+TOT PNL SERPL-MCNC: 0.2 MG/DL (ref 0–0.5)
BUN SERPL-MCNC: 26.3 MG/DL (ref 9.8–20.1)
CALCIUM SERPL-MCNC: 9.4 MG/DL (ref 8.4–10.2)
CHLORIDE SERPL-SCNC: 100 MMOL/L (ref 98–107)
CO2 SERPL-SCNC: 30 MMOL/L (ref 23–31)
COLOR UR: YELLOW
CREAT SERPL-MCNC: 0.8 MG/DL (ref 0.57–1.11)
EOSINOPHIL # BLD AUTO: 0.98 X10(3)/MCL (ref 0–0.9)
EOSINOPHIL NFR BLD AUTO: 6.2 % (ref 0–6.4)
ERYTHROCYTE [DISTWIDTH] IN BLOOD BY AUTOMATED COUNT: 14.3 % (ref 11.5–17)
GLOBULIN SER-MCNC: 3.8 GM/DL (ref 2.4–3.5)
GLUCOSE (UA): NEGATIVE
GLUCOSE SERPL-MCNC: 125 MG/DL (ref 82–115)
HCT VFR BLD AUTO: 38.1 % (ref 37–47)
HGB BLD-MCNC: 12.2 GM/DL (ref 12–16)
HGB UR QL STRIP: ABNORMAL
IMM GRANULOCYTES # BLD AUTO: 0.07 10*3/UL (ref 0–0.02)
IMM GRANULOCYTES NFR BLD AUTO: 0.4 % (ref 0–0.43)
KETONES UR QL STRIP: NEGATIVE
LEUKOCYTE ESTERASE UR QL STRIP: ABNORMAL
LYMPHOCYTES # BLD AUTO: 2.93 X10(3)/MCL (ref 0.6–4.6)
LYMPHOCYTES NFR BLD AUTO: 18.6 % (ref 16–44)
MCH RBC QN AUTO: 29.4 PG (ref 27–31)
MCHC RBC AUTO-ENTMCNC: 32 GM/DL (ref 33–36)
MCV RBC AUTO: 91.8 FL (ref 80–94)
MONOCYTES # BLD AUTO: 1.92 X10(3)/MCL (ref 0.1–1.3)
MONOCYTES NFR BLD AUTO: 12.2 % (ref 4–12.1)
NEUTROPHILS # BLD AUTO: 9.64 X10(3)/MCL (ref 2.1–9.2)
NEUTROPHILS NFR BLD AUTO: 61 % (ref 43–73)
NITRITE UR QL STRIP: NEGATIVE
NRBC BLD AUTO-RTO: 0 % (ref 0–0.2)
PH UR STRIP: 7 [PH] (ref 5–7)
PLATELET # BLD AUTO: 248 X10(3)/MCL (ref 130–400)
PMV BLD AUTO: 14 FL (ref 7.4–10.4)
POTASSIUM SERPL-SCNC: 3.7 MMOL/L (ref 3.5–5.1)
PREALB SERPL-MCNC: 25.2 MG/DL (ref 14–37)
PROT SERPL-MCNC: 6.8 GM/DL (ref 5.8–7.6)
PROT UR QL STRIP: ABNORMAL
RBC # BLD AUTO: 4.15 X10(6)/MCL (ref 4.2–5.4)
RBC #/AREA URNS HPF: ABNORMAL /HPF
SODIUM SERPL-SCNC: 139 MMOL/L (ref 136–145)
SP GR UR STRIP: 1.01 (ref 1–1.03)
SQUAMOUS EPITHELIAL, UA: ABNORMAL /LPF
UROBILINOGEN UR STRIP-ACNC: NEGATIVE
WBC # SPEC AUTO: 15.8 X10(3)/MCL (ref 4.5–11.5)
WBC #/AREA URNS HPF: ABNORMAL /HPF

## 2020-12-09 LAB
ABS NEUT (OLG): 6.89 X10(3)/MCL (ref 2.1–9.2)
BASOPHILS # BLD AUTO: 0.18 X10(3)/MCL (ref 0–0.2)
BASOPHILS NFR BLD AUTO: 1.3 % (ref 0–1)
BUN SERPL-MCNC: 33.5 MG/DL (ref 9.8–20.1)
CALCIUM SERPL-MCNC: 9.6 MG/DL (ref 8.4–10.2)
CHLORIDE SERPL-SCNC: 99 MMOL/L (ref 98–107)
CO2 SERPL-SCNC: 32 MMOL/L (ref 23–31)
CREAT SERPL-MCNC: 0.98 MG/DL (ref 0.57–1.11)
CREAT/UREA NIT SERPL: 34
EOSINOPHIL # BLD AUTO: 1 X10(3)/MCL (ref 0–0.9)
EOSINOPHIL NFR BLD AUTO: 7.5 % (ref 0–6.4)
ERYTHROCYTE [DISTWIDTH] IN BLOOD BY AUTOMATED COUNT: 14.9 % (ref 11.5–17)
FINAL CULTURE: NORMAL
GLUCOSE SERPL-MCNC: 137 MG/DL (ref 82–115)
HCT VFR BLD AUTO: 38.4 % (ref 37–47)
HGB BLD-MCNC: 11.9 GM/DL (ref 12–16)
IMM GRANULOCYTES # BLD AUTO: 0.06 10*3/UL (ref 0–0.02)
IMM GRANULOCYTES NFR BLD AUTO: 0.4 % (ref 0–0.43)
LYMPHOCYTES # BLD AUTO: 3.44 X10(3)/MCL (ref 0.6–4.6)
LYMPHOCYTES NFR BLD AUTO: 25.7 % (ref 16–44)
MCH RBC QN AUTO: 29 PG (ref 27–31)
MCHC RBC AUTO-ENTMCNC: 31 GM/DL (ref 33–36)
MCV RBC AUTO: 93.4 FL (ref 80–94)
MONOCYTES # BLD AUTO: 1.81 X10(3)/MCL (ref 0.1–1.3)
MONOCYTES NFR BLD AUTO: 13.5 % (ref 4–12.1)
NEUTROPHILS # BLD AUTO: 6.89 X10(3)/MCL (ref 2.1–9.2)
NEUTROPHILS NFR BLD AUTO: 51.6 % (ref 43–73)
NRBC BLD AUTO-RTO: 0 % (ref 0–0.2)
PLATELET # BLD AUTO: 221 X10(3)/MCL (ref 130–400)
PMV BLD AUTO: 14.3 FL (ref 7.4–10.4)
POTASSIUM SERPL-SCNC: 4.1 MMOL/L (ref 3.5–5.1)
RBC # BLD AUTO: 4.11 X10(6)/MCL (ref 4.2–5.4)
SODIUM SERPL-SCNC: 140 MMOL/L (ref 136–145)
WBC # SPEC AUTO: 13.4 X10(3)/MCL (ref 4.5–11.5)

## 2020-12-14 LAB
ABS NEUT (OLG): 7.65 X10(3)/MCL (ref 2.1–9.2)
ALBUMIN SERPL-MCNC: 3 GM/DL (ref 3.4–4.8)
ALBUMIN/GLOB SERPL: 0.8 RATIO (ref 1.1–2)
ALP SERPL-CCNC: 78 UNIT/L (ref 40–150)
ALT SERPL-CCNC: 25 UNIT/L (ref 0–55)
AST SERPL-CCNC: 28 UNIT/L (ref 5–34)
BASOPHILS NFR BLD MANUAL: 0 % (ref 0–2)
BILIRUB SERPL-MCNC: 0.4 MG/DL (ref 0.2–1.2)
BILIRUBIN DIRECT+TOT PNL SERPL-MCNC: 0.2 MG/DL (ref 0–0.5)
BILIRUBIN DIRECT+TOT PNL SERPL-MCNC: 0.2 MG/DL (ref 0–0.8)
BUN SERPL-MCNC: 32.4 MG/DL (ref 9.8–20.1)
CALCIUM SERPL-MCNC: 9.6 MG/DL (ref 8.4–10.2)
CHLORIDE SERPL-SCNC: 100 MMOL/L (ref 98–107)
CO2 SERPL-SCNC: 29 MMOL/L (ref 23–31)
CREAT SERPL-MCNC: 0.79 MG/DL (ref 0.57–1.11)
EOSINOPHIL NFR BLD MANUAL: 7 % (ref 0–8)
ERYTHROCYTE [DISTWIDTH] IN BLOOD BY AUTOMATED COUNT: 14.2 % (ref 11.5–17)
GLOBULIN SER-MCNC: 3.8 GM/DL (ref 2.4–3.5)
GLUCOSE SERPL-MCNC: 131 MG/DL (ref 82–115)
GRANULOCYTES NFR BLD MANUAL: 68 % (ref 47–80)
HCT VFR BLD AUTO: 38.4 % (ref 37–47)
HGB BLD-MCNC: 12.2 GM/DL (ref 12–16)
LYMPHOCYTES NFR BLD MANUAL: 15 % (ref 13–40)
MCH RBC QN AUTO: 29.3 PG (ref 27–31)
MCHC RBC AUTO-ENTMCNC: 31.8 GM/DL (ref 33–36)
MCV RBC AUTO: 92.3 FL (ref 80–94)
MONOCYTES NFR BLD MANUAL: 10 % (ref 2–11)
PLATELET # BLD AUTO: 277 X10(3)/MCL (ref 130–400)
PLATELET # BLD EST: ADEQUATE 10*3/UL
PMV BLD AUTO: 13.1 FL (ref 7.4–10.4)
POTASSIUM SERPL-SCNC: 3.8 MMOL/L (ref 3.5–5.1)
PREALB SERPL-MCNC: 20.3 MG/DL (ref 14–37)
PROT SERPL-MCNC: 6.8 GM/DL (ref 5.8–7.6)
RBC # BLD AUTO: 4.16 X10(6)/MCL (ref 4.2–5.4)
RBC MORPH BLD: NORMAL
SODIUM SERPL-SCNC: 139 MMOL/L (ref 136–145)
WBC # SPEC AUTO: 14.3 X10(3)/MCL (ref 4.5–11.5)

## 2020-12-15 LAB
SARS-COV-2 RNA RESP QL NAA+PROBE: DETECTED
SARS-COV-2 RNA RESP QL NAA+PROBE: NOT DETECTED

## 2021-01-24 ENCOUNTER — HISTORICAL (OUTPATIENT)
Dept: ADMINISTRATIVE | Facility: HOSPITAL | Age: 76
End: 2021-01-24

## 2021-01-24 LAB
APPEARANCE, UA: CLEAR
BACTERIA SPEC CULT: ABNORMAL /HPF
BILIRUB UR QL STRIP: NEGATIVE
COLOR UR: YELLOW
GLUCOSE (UA): NEGATIVE MG/DL
HGB UR QL STRIP: ABNORMAL
KETONES UR QL STRIP: NEGATIVE MG/DL
LEUKOCYTE ESTERASE UR QL STRIP: NEGATIVE
NITRITE UR QL STRIP: NEGATIVE
PH UR STRIP: 7.5 [PH] (ref 4.6–8)
PROT UR QL STRIP: >=300 MG/DL
RBC #/AREA URNS HPF: ABNORMAL /HPF
SP GR UR STRIP: 1.02 (ref 1–1.03)
SQUAMOUS EPITHELIAL, UA: ABNORMAL /LPF
UROBILINOGEN UR STRIP-ACNC: 0.2 EU/DL
WBC #/AREA URNS HPF: ABNORMAL /HPF

## 2021-03-12 ENCOUNTER — HISTORICAL (OUTPATIENT)
Dept: RADIOLOGY | Facility: HOSPITAL | Age: 76
End: 2021-03-12

## 2022-04-10 ENCOUNTER — HISTORICAL (OUTPATIENT)
Dept: ADMINISTRATIVE | Facility: HOSPITAL | Age: 77
End: 2022-04-10

## 2022-04-29 VITALS
SYSTOLIC BLOOD PRESSURE: 112 MMHG | WEIGHT: 163.13 LBS | BODY MASS INDEX: 26.22 KG/M2 | DIASTOLIC BLOOD PRESSURE: 62 MMHG | HEIGHT: 66 IN

## 2022-04-30 NOTE — DISCHARGE SUMMARY
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         PAD         Carotid artery stenosis         DM type II         HTN         Anxiety/depression         HLD         Thrush         Reactive leukocytosis         PCM         Underlying dementia  Discharge Diagnosis: 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 (stable)            Dysphagia (current)           Paroxysmal atrial fibrillation (stable)            Left ICA aneurysm and right VINTIA aneurysm (stable)            PAD (stable)            Carotid artery stenosis (stable)            DM type II (stable)            HTN (stable)            Anxiety/depression (stable)            HLD (stable)            Thrush (resolved)           Reactive leukocytosis (stable)            PCM (improved)           Underlying dementia (stable)   Internal Medicine (attending): Gabe Lay MD  Physiatry (consulting): Nile Huerta MD    OUTPATIENT PROVIDERS  PCP: ANTONIO Jones  Cardiology: Tr Donovan MD  Neurology: Casey Burch MD  Gastroenterology: Robi Mcclellan MD (inserted PEG tube )      Hospital Course   75-year-old white female presented to [facility] ED on 11/10/2020 via air med with left-sided gaze, aphasic and right-sided hemiparesisflaccid.  PMH significant for cerebral aneurysm, left ICA aneurysm with multiple CVAs with residual mild right-sided weakness, paroxysmal atrial fibrillation on Eliquis, PAD s/p BL LE stenting, DM type II, carotid artery stenosis,  and dementia.  Initial CT head unremarkable.  MRI brain on 11/12 significant for large acute nonhemorrhagic left MCA vascular territory infarct aspirin 81 mg initiated on 11/13.  Eliquis not resumed initially due to size of infarct.  CT head on 11/14 with no evidence of hemorrhagic transformation.  Reportedly had worsening neurological symptomsnot following commands on 11/15.  CT head on 11/15 with stable evolving left MCA territory infarct.  Tolerated PEG placement on 11/18 without periprocedural complications.  CT head 11/24 with evolving changes of left MCA territory infarct with decreasing mass-effect and development of cortical laminar necrosis.  Neurology recommended repeating CT head in 1 week which would be December 1.  Continue to hold anticoagulation with Eliquis.  Tolerated transfer to [facility] inpatient rehab unit on 11/25 without incident.    During inpatient rehab course patient lethargic on admission.  Xanax discontinued.  BP on low side on 11/26 and metoprolol tartrate decreased to 25 mg twice daily.  Repeat CT head on 12/1 with no acute changes.  Discussed with neurology.  Eliquis 5 mg twice daily initiated on 12/3.  Lovenox and aspirin discontinued.  Urine culture significant for Klebsiella on 12/2 Rocephin 1 g x 5 days initiated.  Episodes of hypotension reported and received NS bolus 500 mL and metoprolol tartrate decreased 12.5 mg twice daily on 12/2.  Cellulitis changes on 12/4 reported with increased weakness and left facial droop.  Repeat CT head with increased edema in the left MCA territory infarct.  BP did drop down in the 80s.  Metoprolol discontinued and received IVF resuscitation with resolution of symptoms.  Neurology recommended holding off BP meds.  Neurology reported CT head unremarkable for acute changes.  Zoloft 100 mg daily increased on 12/7 due to positive depression screen.  Later in the evening staff reported atrophy of RVR with heart rate in the 150s.  Metoprolol IV given.   Continued with atrial fib RVR on 12/8 and responded to digoxin.  Heart rate remained controlled on digoxin 125 mcg daily.  Remains incontinent of bowel.  Remained a max assist to dependent.  Therapy reports she has not made much of any improvement.  Plan for projected discharge to [facility] on 12/16.  COVID-19 test replacement positive on 12/15.  Repeat PCR was negative.  Tolerated transfer to [facility] to [facility].  To complete 20 days of quarantine prior to transfer to [facility].  Vital signs and CBGs at goal.  Sleep hygiene, tube feedings, and bowel maintenance remain at goal.  Recent lab work unremarkable.  Reactive leukocytosis stable.  Med reconciliation completed.  Discharge orders initiated.  Stable for transfer to [facility].  To follow-up with NP or MD within 24 to 72 hours admission to [facility] facility.    Chief Complaint: Large left MCA territory infarct with right-sided hemiparesis, aphasia, and dysphagia s/p PEG placement on 11/18/2020       Physical Examination      Vital Signs (last 24 hrs)_____  Last Charted___________  Temp Oral         36.7 DegC  (DEC 15 14:17)  Heart Rate Peripheral   77 bpm  (DEC 15 14:17)  Resp Rate             18 br/min  (DEC 15 05:07)  SBP      134 mmHg  (DEC 15 14:17)  DBP      78 mmHg  (DEC 15 14:17)  SpO2      98 %  (DEC 15 14:17)     General:  No acute distress, Alert.    Eye:  Pupils are equal, round and reactive to light, Vision unchanged.    HENT:  Normocephalic.    Neck:  Supple, Non-tender.    Respiratory:  Lungs are clear to auscultation, Respirations are non-labored, Breath sounds are equal, Symmetrical chest wall expansion, No chest wall tenderness.    Cardiovascular:  Normal rate, Regular rhythm, No murmur, Good pulses equal in all extremities, Normal peripheral perfusion, No edema.    Gastrointestinal:  Soft, Non-tender, Normal bowel sounds, PEG tube intact with no redness, drainage, or swelling.    Musculoskeletal:  Flaccid  to right side, Moves left side spontaneously.    Integumentary:  Warm, Dry   Neurologic:  Alert, right-sided hemiplegia.    Cognition and Speech:  Global aphasia.   Vocalizes, but can only understand simple words, Following commands.    Psychiatric:  Cooperative, calm moodflat affect.        Results Review   General results   Most recent results   Discrete results only   12/15/2020 14:48 CST     SARS-CoV-2 PCR            Not Detected    12/15/2020 12:14 CST     SARS-CoV-2 PCR            Detected    12/15/2020 11:40 CST     POC CBG                   164 mg/dL  HI    12/15/2020 5:08 CST      POC CBG                   124 mg/dL  HI    12/15/2020 5:00 CST      Blood Glucose Interventions               No interventions needed    12/14/2020 20:39 CST     POC CBG                   119 mg/dL  HI    12/14/2020 20:00 CST     Blood Glucose Interventions               No interventions needed    12/14/2020 16:28 CST     POC CBG                   174 mg/dL  HI    12/14/2020 14:29 CST     POC CBG                   133 mg/dL  HI    12/14/2020 6:37 CST      Est Creat Clearance Ser   54.26 mL/min    12/14/2020 5:35 CST      WBC                       14.3 x10(3)/mcL  HI                             RBC                       4.16 x10(6)/mcL  LOW                             Hgb                       12.2 gm/dL                             Hct                       38.4 %                             Platelet                  277 x10(3)/mcL                             MCV                       92.3 fL                             MCH                       29.3 pg                             MCHC                      31.8 gm/dL  LOW                             RDW                       14.2 %                             MPV                       13.1 fL  HI                             Abs Neut                  7.65 x10(3)/mcL                             Segs Man                  68 %                             Lymph Man                 15 %                              Monocyte Man              10 %                             Eos Man                   7 %                             Basophil Man              0 %                             Platelet Est              Adequate                             RBC Morph                 Normal                             Sodium Lvl                139 mmol/L                             Potassium Lvl             3.8 mmol/L                             Chloride                  100 mmol/L                             CO2                       29 mmol/L                             Calcium Lvl               9.6 mg/dL                             Glucose Lvl               131 mg/dL  HI                             BUN                       32.4 mg/dL  HI                             Creatinine                0.79 mg/dL                             eGFR-AA                   >60  NA                             eGFR-COURTNEY                  >60 mL/min/1.73 m2  NA                             Bili Total                0.4 mg/dL                             Bili Direct               0.2 mg/dL                             Bili Indirect             0.20 mg/dL                             AST                       28 unit/L                             ALT                       25 unit/L                             Alk Phos                  78 unit/L                             Total Protein             6.8 gm/dL                             Albumin Lvl               3.0 gm/dL  LOW                             Globulin                  3.8 gm/dL  HI                             A/G Ratio                 0.8 ratio  LOW                             Prealbumin                20.3 mg/dL           Discharge Plan   Discharge Summary Plan   Discharge Status: improved.        Location: Discharge to SNF- COVID unit     Medications: See discharge medicine reconciliation     Activity: as tolerate     Diet: tube feedings     Instructions:  Take all  medications as prescribed.           Attend appointments as scheduled.           Return to ED if symptoms worsen, or if t > 100.4.     Education: CVA.  HTN.  DM type II.  Anxiety/depression     Follow-up: Follow-up with NP or MD within 24 to 72 hours of admission to skilled facility    Discussed plan of care, and patient communicated understanding. Agreed to comply with recommendations.    Discharge Time: 51 minutes

## 2022-05-04 NOTE — HISTORICAL [facility] CERNER
This is a historical note converted from [employer]. Formatting and pictures may have been removed.  Please reference [employer] for original formatting and attached multimedia. Chief Complaint  CVA with right hemiplegia, dysphagia, and aphasia  Reason for Consultation  Physiatry  History of Present Illness  Admit MD: Gabe Lay MD  Consult Physiatry: Nile Huerta MD  HPI: 74yo WF with PMH of cerebral aneurysm, left ICA aneurysm, multiple CVAs with residual mild right sided weakness, PAF on Eliquis, RYLIE, PAD/BLE stenting, HTN, DM type II, PAD, dementia, and KEITH presents to [facility] ED via [facility] as a CODE FAST with left lateral gaze, aphasia, and right sided weakness at 1910 on 11/10. Son at bedside gave report to MD, that he had left patient on commode and returned to her slumped over and non-responsive. She had right upper extremity paralysis, expressive aphasia, and left lateral gaze. At baseline, she has dementia and ambulated with a walker with residual right sided weakness from 1994 stroke.? She drags her right leg at times. Compliant with all?medications.?CT of the head revealed encephalomalacia in the left occipital lobe, decreased attenuation of the periventricular white matter, encephalomalacia extending into the left parietal and frontal lobe, no hemorrhage. CTA of the head and neck showed very dense calcification of the origin of the right vertebral and the left subclavian, the carotid siphons are densely calcified bilaterally and decreased flow in the left total cerebral without a focal stenosis. Interventional neurology consulted, reviewed images, and recommended to keep SBP >140. Patient was not a candidate for TPA, as she is on Eliquis. Admitted to the hospitalist service for continued stroke workup.?Of note, elevated troponin of 0.074, EKG sinus rhythm with PACs, and T-wave abnormality. During the night, HR was sustaining in 130s, EKG showed ST. Given 500cc bolus with minimal improvement. CTA Chest  ordered to r/o PE vs aspiration. CT thorax revealed no PE, small bilateral pleural effusions, mild basilar atelectasis. MRI brain?significant for?a large left MCA territory infarct. CTA head and neck revealed very dense calcification at the origin of the right vertebral and left subclavian could not rule out significant stenosis, carotid siphons are dense calcifications bilaterally, decreased flow in the left middle cerebral without focal stenosis. Patient was on Eliquis 5 mg twice daily at home for A. fib. and non-STEMI. Cardiology consulted and recommended outpatient work-up for non-STEMI if neurologic improvement in?about 1 month. On 11/13, MDR was performed with involved family member, who?stated that she has had previous strokes and was a minimal transfer assist at baseline. She continued to improve neurologically but?continued with?significant weakness, global aphasia, and?severe dysphagia.? PEG tube placed?on 11/18. Abdominal distention noted, tube feedings held, and?stool softeners started. She had multiple bowel movements, and distention resolved.?Noted?A.fib with RVR on 11/23. ?Meds adjusted, and patient reverted to?NSR. Participating with therapy.?Functional status includes bed mobility, grooming, supine to sit, sit to stand, and Amb?for 4ft requiring moderate assistance. Patient was evaluated, accepted, and admitted to inpatient rehab to improve functional status. Transferred to [facility] on 11/24 without incident.??  11/25: Seen in patient room, lying in bed sleeping. Severe aphasia limiting communication. ST unable to complete evaluation yesterday or this morning due to lethargy. Missing therapy this morning 2/2 hypotension. BP 80s/50s supine in bed. Initiate 500mL NaCl bolus. She is using some facial expressions, grunting, and gestures for communication. Will attempt to set her up on electronic tablet with touch type communication applications. Able to move LUE. Reports poor sleep overnight. Appears to  agree to pain, possibly headache and tingling to RLE. Attempting to wiggle toes, but no movement noted to left or right. Tolerating tube feeds. Therapy evaluating. VSSAF with above mentioned hypotension. IM following.  Review of Systems  Comprehensive Review of Systems performed with no exceptions other than as noted in HPI.  Barriers to Discharge:  Social:Patient has completed 8th grade. ?She is retired and used to work as a  but has not worked since 1994- receives disability / Denies history of  involvement ??  Patient is  since July 2020. ?Prior to his death, her  was the main caretaker for her. ?She has declined since his passing. ?Her son has assumed the role of her caretaker. He assists with ADLS, provides 24-hour care, provided all cleaning and cooking, managed medications, provided transportation and managed finances. ?He is also disabled and does not work. ?He tells me he CAN assists physically.??Patient has 5 children total; all children live out of town except for one son.?  Medical: left MCA territory infarct, small bilateral pleural effusions, mild basilar atelectasis, right hemiplegia, global aphasia, dysphagia, A. Fib with RVR, encephalomalacia, cerebral aneurysm, left ICA aneurysm, multiple CVAs with residual mild right sided weakness and aphasia, PAF on Eliquis, RYLIE, PAD/BLE stenting, HTN, DM type II,? dementia, and KEITH  Functional:  Prior Level of Fx: ?Patient needed assistance with ADLS (bathing and dressing), she would eat independently, needed assistance with mobility  Residence: ?Patient live with son in [place]. ?They live in a single-story home with a ramp to gain entry. ?She uses a tub/shower with a grab bar installed and a hand held shower. *Does not have elevated toilet; but uses bedside commode over toilet *Of note: bathroom is very small and a wheelchair cannot fit  DME: ? Patient has the following DME: wheelchair, beside commode (over toilet), rolling  walker, rollator, hand held shower, and aleksandra bar in tub/shower.?  Diet: ? NPO-Tube Feeds  Psychiatric:?Hx mental health/substance abuse: ?History of dementia- son does not know of any mental health problems but states patients mood has declined since her  has passed away / Former smoker??  ?  ?  Physical Exam  Vitals & Measurements  T:?36.7? ?C (Oral)? HR:?66(Peripheral)? RR:?18? BP:?86/51? SpO2:?96%?  General:?well-developed, well-nourished, in no acute distress,?lethargic  Eye: EOMI, clear conjunctiva, eyelids normal  HENT:?normocephalic, ?oropharynx and nasal mucosal surfaces moist  Neck:? supple  Respiratory:?clear to auscultation bilaterally  Cardiovascular:?regular rate and rhythm without murmurs, gallops or rubs  Gastrointestinal:?soft, non-tender, PEG intact, non-distended with normal bowel sounds, without masses to palpation  Musculoskeletal:?right hemiplegia, noted LUE movement  Integumentary: no rashes or skin lesions present, PEG tube intact, thickened white tongue  Neurologic: Awake, global aphasia, does not follow commands consistently, facial expressions, gestures with left hand, and grunting for communication  ?  Assessment/Plan  1.?Ischemic cerebrovascular accident (CVA)?I63.9  2.?Pleural effusion?J90  3.?Right hemiplegia?G81.91  4.?Global aphasia?R47.01  5.?Dysphagia?R13.10  6.?Atrial fibrillation with RVR?I48.91  7.?Brain aneurysm?I67.1  8.?KEITH (cerebral atherosclerosis)?I67.2  9.?Dementia?F03.90  10.?Hypertension?I10  11.?Diabetes mellitus?E11.9  12.?PAD (peripheral artery disease)?I73.9  13.?RYLIE (renal artery stenosis)?I70.1  14.?Former smoker?Z87.891  15.?Leukocytosis?D72.829  Orders:  miconazole topical, 1 sowmya, form: Powder, TOP, BID, first dose 11/24/20 14:42:00 CST, to eloy groin and periarea  nystat/lidoc/hydrocort/tcn/diphenhyd, 5 mL, form: Susp, Oral, TID, first dose 11/24/20 22:00:00 CST, [place] oral cavity with swab following oral care using suction to prevent swallowing  Sodium  Chloride 0.9% intravenous solution 500 mL, 500 mL, 500 mL, IV, 500 mL/hr, order duration: 1 dose(s), start date 11/25/20 13:02:00 CST, stop date 11/25/20 14:01:00 CST, 1.8, m2  MD to Nurse, 11/24/20 15:48:00 CST, TID, Oral Care  Wound Care Complex, 11/24/20 14:28:00 CST, Daily, Turn q 2 hours and use wedge. Bilateral heel boots.  Wound Care Complex, 11/24/20 14:37:00 CST, Daily, PEG site: cleanse with saline, pat dry, apply gauze dressing.  Wound Care Complex, 11/24/20 14:25:00 CST, BID, Gluteal cleft intertrigo: cleanse and dry well, apply Sensicare protective barrier.  Pain:?questionable headache  acetaminophen (Tylenol)650 mg, form: Tab, PEG Tube, q4hr PRN for as needed for pain  traMADol 50 mg, form: Tab, PEG Tube, TID PRN for pain  Wounds:?PEG tube intact, thickened white tongue  miconazole topical, 1 sowmya, form: Powder, TOP, BID, first dose 11/24/20 14:42:00 CST, to eloy groin and periarea  nystat/lidoc/hydrocort/tcn/diphenhyd, 5 mL, form: Susp, Oral, TID, first dose 11/24/20 22:00:00 CST, [place] oral cavity with swab following oral care using suction to prevent swallowing  MD to Nurse, 11/24/20 15:48:00 CST, TID, Oral Care  Wound Care Complex, 11/24/20 14:28:00 CST, Daily, Turn q 2 hours and use wedge. Bilateral heel boots.  Wound Care Complex, 11/24/20 14:37:00 CST, Daily, PEG site: cleanse with saline, pat dry, apply gauze dressing.  Wound Care Complex, 11/24/20 14:25:00 CST, BID, Gluteal cleft intertrigo: cleanse and dry well, apply Sensicare protective barrier.  Precautions:? fallrisk?  Bracing: none  Bowels/Bladder: last BM 11/24 x 2? ?  Swallowing:?NPO. Tube feedings.??  Sleep:? poor?per patient facial expression/grunt  Depression/Anxiety:  sertraline 50 mg oral tablet)50 mg, form: Tab, PEG Tube, Daily  Function: Tolerating therapy. Continue PT/OT/RT  VTE Prophylaxis:?  enoxaparin (Lovenox)40 mg, form: Injection, Subcutaneous, Daily  Code Status:? FULL CODE?  Discharge:?Patient live with son in [place].  ?They live in a single-story home with a ramp to gain entry. She is retired and used to work as a  but has not worked since 1994- receives disability. Patient is  since July 2020. ?Prior to his death, her  was the main caretaker for her. ?She has declined since his passing. ?Her son has assumed the role of her caretaker. He assists with ADLS, provides 24-hour care, provided all cleaning and cooking, managed medications, provided transportation and managed finances. ?He is also disabled and does not work. ?He tells me he CAN assists physically.??Patient has 5 children total; all children live out of town except for one son.??Date pending. ???   Problem List/Past Medical History  Ongoing  Arthritis  Atrial fibrillation with RVR  Brain aneurysm  KEITH (cerebral atherosclerosis)  Dementia  Diabetes mellitus  Expressive aphasia  Former smoker  Hypertension  PAD (peripheral artery disease)  RYLIE (renal artery stenosis)  Historical  Atrial fibrillation  Dyslipidemia  PAD (peripheral artery disease)  Procedure/Surgical History  Insertion of Feeding Device into Stomach, Percutaneous Approach (11/18/2020)  PEG Tube Insertion Initial (11/18/2020)  Destruction of Right Lens, Percutaneous Approach (03/09/2017)  Discission of secondary membranous cataract (opacified posterior lens capsule and/or anterior hyaloid); laser surgery (eg, YAG laser) (1 or more stages) (03/09/2017)  Destruction of Left Lens, Percutaneous Approach (10/29/2015)  Discission of secondary membranous cataract (opacified posterior lens capsule and/or anterior hyaloid); laser surgery (eg, YAG laser) (1 or more stages) (10/29/2015)  Angioplasty of Other Non-Coronary Vessel(s) (03/28/2013)  Aortography (03/28/2013)  Arteriography of femoral and other lower extremity arteries (03/28/2013)  Arteriography of renal arteries (03/28/2013)  Insertion of Non-Drug-Eluting Peripheral (Non-Coronary) Vessel Stent(s) (03/28/2013)  Insertion of two vascular  stents (03/28/2013)  Procedure on two vessels (03/28/2013)  Revascularization, endovascular, open or percutaneous, iliac artery, unilateral, initial vessel; with transluminal stent placement(s), includes angioplasty within the same vessel, when performed. (03/28/2013)  Selective catheter placement (first-order), main renal artery and any accessory renal artery(s) for renal angiography, including arterial puncture and catheter placement(s), fluoroscopy, contrast injection(s), image postprocessing,. (03/28/2013)  Arteriography of femoral and other lower extremity arteries (02/14/2013)  Introduction of needle or intracatheter; extremity artery. (02/14/2013)  Arteriography of cerebral arteries (11/15/2012)  Selective catheter placement, arterial system; additional second order, third order, and beyond, thoracic or brachiocephalic branch, within a vascular family (List in addition to code for initial second or third order vessel as sowmya. (11/15/2012)  Selective catheter placement, arterial system; initial second order thoracic or brachiocephalic branch, within a vascular family. (11/15/2012)  Selective catheter placement, arterial system; initial second order thoracic or brachiocephalic branch, within a vascular family. (11/15/2012)  Selective catheter placement, arterial system; initial third order or more selective thoracic or brachiocephalic branch, within a vascular family. (11/15/2012)  Angiogram  Tubal ligation   Medications  Inpatient  Aricept, 5 mg= 1 tab(s), PEG Tube, At Bedtime  aspirin 81 mg oral tablet, CHEWABLE, 81 mg= 1 tab(s), PEG Tube, Daily  atorvastatin 40 mg oral tablet, 40 mg= 1 tab(s), PEG Tube, At Bedtime  brimonidine 0.15% ophthalmic solution, 1 drop(s), Eye-Both, BID  Dextrose 50% and Water (50 mL vial/syringe), 12.5 gm= 25 mL, IV Push, Once, PRN  Dextrose 50% and Water (50 mL vial/syringe), 12.5 gm= 25 mL, IV Push, As Directed, PRN  Dextrose 50% and Water (50 mL vial/syringe), 25 gm= 50 mL, IV  Push, As Directed, PRN  Dextrose 50% in Water intravenous solution, 12.5 gm= 25 mL, IV Push, As Directed, PRN  Dulcolax Laxative 10 mg RECTAL suppository, 10 mg= 1 supp, PA (rectal), q3day, PRN  Foltx, 1 tab(s), PEG Tube, Daily  glucagon, 1 mg= 1 EA, IM, q10min, PRN  glucagon, 1 mg= 1 EA, IM, q10min, PRN  hydrALAZINE, 10 mg= 0.5 mL, IV Push, q4hr, PRN  insulin lispro, 2-14 units, Subcutaneous, As Directed, PRN  IVF Normal Saline NS Bolus 500ml 500 mL, 500 mL, IV  labetalol, 10 mg= 2 mL, IV Push, q10min, PRN  lactulose 10 g/15 mL oral syrup, 20 gm= 30 mL, PEG Tube, Every other day, PRN  Lovenox, 40 mg= 0.4 mL, Subcutaneous, Daily  Magic Mouthwash, 5 mL, Oral, TID  metoprolol tartrate 1 mg/mL injectable sol, 5 mg= 5 mL, IV Push, q2hr, PRN  metoprolol tartrate 50 mg oral tab, 50 mg= 1 tab(s), PEG Tube, BID  miconazole 2% topical powder, 1 sowmya, TOP, BID  nitroglycerin 0.4 mg sublingual TAB, 0.4 mg= 1 tab(s), SL, q5min, PRN  ondansetron 4 mg oral tablet, disintegrating, 8 mg= 2 tab(s), Oral, q8hr, PRN  Pepcid 20 mg oral tablet, 20 mg= 1 tab(s), PEG Tube, BID, PRN  sertraline 50 mg oral tablet, 50 mg= 1 tab(s), PEG Tube, Daily  simethicone 80 mg Chew Tab ( Mylanta Gas ), 160 mg= 2 tab(s), Oral, TID, PRN  traMADol, 50 mg= 1 tab(s), PEG Tube, TID, PRN  Tylenol, 650 mg= 2 tab(s), PEG Tube, q4hr, PRN  Home  alPRAzolam 0.25 mg oral tab, 0.25 mg= 1 tab(s), Oral, BID  aspirin 81 mg oral tablet, CHEWABLE, 81 mg= 1 tab(s), Oral, Daily  atorvastatin 40 mg oral tablet, 40 mg= 1 tab(s), Oral, At Bedtime  Brimonidine 0.2% Eye Drop, 1 drop(s), Eye-Both, BID  glimepiride 1 mg oral tablet, 1 mg= 1 tab(s), Oral, Daily  metoprolol tartrate 50 mg oral tab, 50 mg= 1 tab(s), Oral, BID  mirtazapine 15 mg oral tablet, 15 mg= 1 tab(s), Oral, Once a day (at bedtime)  Multi Vitamin+  oxybutynin 5 mg oral tablet, 5 mg= 1 tab(s), Oral, BID  sertraline 50 mg oral tablet, 50 mg= 1 tab(s), Oral, Daily  Allergies  No Known Allergies  Social  History  Abuse/Neglect  No, No, Yes, 11/24/2020  No, No, Yes, 02/05/2020  Alcohol - Denies Alcohol Use, 11/15/2012  Never, 02/05/2020  Substance Use - Denies Substance Abuse, 02/07/2013  Never, 02/05/2020  Tobacco - Low Risk, 11/15/2012  Former smoker, quit more than 30 days ago, N/A, 11/24/2020  Former smoker, quit more than 30 days ago, No, 02/05/2020  Family History  Hypertension.: Mother.  Lab Results  Test Name Test Result Date/Time   Sodium Lvl 143 mmol/L 11/25/2020 05:56 CST   Potassium Lvl 4.0 mmol/L 11/25/2020 05:56 CST   Chloride 102 mmol/L 11/25/2020 05:56 CST   CO2 30 mmol/L 11/25/2020 05:56 CST   Calcium Lvl 9.6 mg/dL 11/25/2020 05:56 CST   Magnesium Lvl 2.02 mg/dL 11/25/2020 05:56 CST   Glucose Lvl 125 mg/dL (High) 11/25/2020 05:56 CST   BUN 23.5 mg/dL (High) 11/25/2020 05:56 CST   Creatinine 0.93 mg/dL 11/25/2020 05:56 CST   Est Creat Clearance Ser 46.09 mL/min 11/25/2020 07:08 CST   eGFR-AA >60 11/25/2020 05:56 CST   eGFR-COURTNEY >60 mL/min/1.73 m2 11/25/2020 05:56 CST   Bili Total 0.3 mg/dL 11/25/2020 05:56 CST   Bili Direct 0.2 mg/dL 11/25/2020 05:56 CST   Bili Indirect 0.10 mg/dL 11/25/2020 05:56 CST   AST 37 unit/L (High) 11/25/2020 05:56 CST   ALT 20 unit/L 11/25/2020 05:56 CST   Alk Phos 59 unit/L 11/25/2020 05:56 CST   Total Protein 6.9 gm/dL 11/25/2020 05:56 CST   Albumin Lvl 2.9 gm/dL (Low) 11/25/2020 05:56 CST   Globulin 4.0 gm/dL (High) 11/25/2020 05:56 CST   A/G Ratio 0.7 ratio (Low) 11/25/2020 05:56 CST   Phosphorus 3.3 mg/dL 11/25/2020 05:56 CST   Prealbumin 16.6 mg/dL 11/25/2020 05:56 CST   WBC 12.3 x10(3)/mcL (High) 11/25/2020 05:56 CST   RBC 4.22 x10(6)/mcL 11/25/2020 05:56 CST   Hgb 12.0 gm/dL 11/25/2020 05:56 CST   Hct 39.2 % 11/25/2020 05:56 CST   Platelet 452 x10(3)/mcL (High) 11/25/2020 05:56 CST   MCV 92.9 fL 11/25/2020 05:56 CST   MCH 28.4 pg 11/25/2020 05:56 CST   MCHC 30.6 gm/dL (Low) 11/25/2020 05:56 CST   RDW 14.3 % 11/25/2020 05:56 CST   MPV 12.5 fL (High) 11/25/2020 05:56  CST   Abs Neut 6.84 x10(3)/mcL 11/25/2020 05:56 CST   Neutro Auto 55.6 % 11/25/2020 05:56 CST   Lymph Auto 21.3 % 11/25/2020 05:56 CST   Mono Auto 10.8 % 11/25/2020 05:56 CST   Eos Auto 9.7 % (High) 11/25/2020 05:56 CST   Abs Eos 1.19 x10(3)/mcL (High) 11/25/2020 05:56 CST   Basophil Auto 1.9 % (High) 11/25/2020 05:56 CST   Abs Neutro 6.84 x10(3)/mcL 11/25/2020 05:56 CST   Abs Lymph 2.63 x10(3)/mcL 11/25/2020 05:56 CST   Abs Mono 1.33 x10(3)/mcL (High) 11/25/2020 05:56 CST   Abs Baso 0.24 x10(3)/mcL (High) 11/25/2020 05:56 CST   NRBC% 0.0 % 11/25/2020 05:56 CST   IG% 0.700 % (High) 11/25/2020 05:56 CST   IG# 0.0900 (High) 11/25/2020 05:56 CST   Diagnostic Results  ?  BLOOD CULTURE  Final - November 20, 2020 6:05 CST -?  Final Report: ?At 5 days. ?No growth  Pre - November 19, 2020 6:05 CST -?  No growth at 4 days  ?  ?  (11/10/2020 20:45 CST CT Head CODE FAST)  There is vascular calcification. There is encephalomalacia in the left  occipital lobe. There is decreased attenuation the periventricular  white matter consistent with ischemia. There is encephalomalacia  extending into the left parietal and frontal lobe. There is no  hemorrhage or extra-axial fluid collections noted. No masses is seen  no acute infarcts are noted. The calvarium appears intact.  IMPRESSION: Extensive chronic changes superimposed acute disease is  seen. [1]  ?  (11/10/2020 21:16 CST CT Angio Head W W/O Contrast)  IMPRESSION: Very dense calcification of the origin of the right  vertebral and The left subclavian cannot rule out significant  stenosis.  The carotid siphons are densely calcified bilaterally and I cannot  well evaluate the siphons.  There is decreased flow in the left middle cerebral without a focal  stenosis noted [2]  ?  (11/11/2020 03:58 CST XR Chest 1 View)  There is a calcified nodule on the right consistent with a healed  granuloma. Heart size is within normal limits. The costophrenic angles  are clear. There is vascular  calcification noted.  IMPRESSION: No acute disease is seen [3]  ?  (11/11/2020 04:58 CST CT Angio Chest PE W Contrast)  The mediastinum reveals no significant adenopathy. There is mild  calcification in the thoracic aorta. There is a 1.3 x 1.5 cm left  adrenal nodule cannot be well categorized on this single phase exam.  There is dense calcification in the SMA.  There are small blebs in the right upper lobe there is a calcification  adjacent to the major fissure on the right. There is small bilateral  pleural effusions with associated atelectasis.  There are no filling defects seen within the pulmonary arteries to  indicate an embolus  IMPRESSION: No pulmonary embolus is identified.  Small bilateral pleural effusions.  Mild by basilar atelectasis.  Small nodule of the left adrenal gland cannot be accurately evaluated  on this single phase study. [4]  ?  (11/12/2020 11:35 CST XR Abdomen KUB 1 View)  FINDINGS: NG tube tip is below the diaphragm and likely just past the  expected location of the gastroesophageal junction. The sidehole is  likely the distal esophagus. Consider advancing the tube another 10  cm.  There is a nonspecific bowel gas pattern with no convincing evidence  of bowel obstruction. No free air. There are bilateral iliac stents.  Soft tissues and osseous structures are otherwise unremarkable. [5]  ?  (11/12/2020 14:25 CST XR Abdomen KUB 1 View)  Impression:  NG tube is minimally changed with sidehole at the GE junction.  Recommend advancement.? [6]  ?  (11/12/2020 18:48 CST MRI Brain W/O Contrast)  DISCUSSION:  Scalp: No abnormalities.  Bone marrow: ?No signal abnormalities.  Brain sulci: Appropriate for patients age.  Ventricles: Normal in size and configuration. No hydrocephalus.  Extra-axial spaces:  No masses or fluid collections.  Parenchyma:  Left frontoparietal and insular 10.5 x 4 cm cortical/subcortical T2  FLAIR hyperintensities associated with diffusion restriction and no  magnetic  susceptibility.  No additional acute vascular insults.  Medial left frontoparietal and occipitotemporal foci of cortical based  encephalomalacia compatible with remote insults in the left VINITA and  PCA vascular territories, respectively.  Old bilateral basal ganglia and right thalamic lacunar insults.  Severe chronic microangiopathy in the derian and supratentorial white  matter.  No mass or hemorrhage.  Vessels: Normal flow voids in major arteries and veins.  Sellar/Suprasellar region: No abnormalities.  Craniocervical junction: No abnormalities.  Incidental findings:  Status post bilateral cataract surgery.  Scattered mild T2 hyperintense mucosal thickening in the paranasal  sinuses.  IMPRESSION:?  1. Large acute nonhemorrhagic left MCA vascular territory infarct.  2. Otherwise no acute intracranial abnormalities. [7]  ?  (11/14/2020 00:45 CST XR Chest 1 View)  FINDINGS:  Examination reveals mediastinal silhouette to be within normal limits  cardiac silhouette is mildly enlarged there is some increase in  interstitial markings with slightly more confluent opacities at the  right base early infiltrate cannot be completely excluded.  In addition there is increase left retrocardiac density with partial  silhouetting of the left hemidiaphragm which might be related to an  infiltrate/atelectases.  There has been interval insertion of nasogastric tube tip below the  diaphragm [8]  ?  (11/14/2020 14:00 CST CT Head W/O Contrast)  IMPRESSION:  Evolving left MCA territory infarct without evidence of hemorrhagic  transformation or midline shift. [9]  ?  (11/15/2020 08:57 CST CT Head W/O Contrast)  IMPRESSION:  Stable exam with evolving left MCA territory infarct. [10]  ?  (11/20/2020 14:58 CST XR Abdomen KUB 1 View)  FINDINGS  Lines/tubes/devices:  The NG tube is no longer visualized, with left upper quadrant  gastrostomy now in place.  Bilateral iliac stents are unchanged.  Detection of air-fluid levels and low-volume  pneumoperitoneum is  limited due to technique.  A non-obstructed bowel gas pattern is present. No intra-abdominal mass  effect is appreciated.  Included osseous structures are without acute abnormality. Regional  degenerative changes are incidentally noted.  IMPRESSION  1. ?Interval placement of percutaneous gastrostomy.  2. ?No evidence of acute intra-abdominal process. [11]  ?  (11/20/2020 14:59 CST XR Chest 1 View)  IMPRESSION  1. ?Mildly improved bilateral lower lung aeration.  2. ?Interval NG tube removal.  3. ?Remaining findings are similar. [12]  ?  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 [13]  ?  (11/25/2020 12:46 CST XR Chest 1 View)  Impression:?  No acute findings. Stable chest radiograph. [14]     [1]?CT Head CODE FAST; Demetrius Fox MD 11/10/2020 20:45 CST  [2]?CT Angio Head W W/O Contrast; Demetrius Fox MD 11/10/2020 21:16 CST  [3]?XR Chest 1 View; Demetrius Fox MD 11/11/2020 03:58 CST  [4]?CT Angio Chest PE W Contrast; Demetrius Fox MD 11/11/2020 04:58 CST  [5]?XR Abdomen KUB 1 View; Marycruz Murphy MD 11/12/2020 11:35 CST  [6]?XR Abdomen KUB 1 View; Reinaldo Melendez MD 11/12/2020 14:25 CST  [7]?MRI Brain W/O Contrast; Marcia Lorenzo MD 11/12/2020 18:48 CST  [8]?XR Chest 1 View; Albert Messina MD 11/14/2020 00:45 CST  [9]?CT Head W/O Contrast; Mitra Monroe MD 11/14/2020 14:00 CST  [10]?CT Head W/O Contrast; Mitra Monroe MD 11/15/2020 08:57 CST  [11]?XR Abdomen KUB 1 View; Aren Del Cid MD 11/20/2020 14:58 CST  [12]?XR Chest 1 View; Aren Del Cid MD 11/20/2020 14:59 CST  [13]?CT Head W/O Contrast; Mitra Monroe MD 11/24/2020 06:12 CST  [14]?XR Chest 1 View; Ac Hernandez MD 11/25/2020 12:46 CST   I have?seen and examined patient?in initial Physiatry consult  case & medical records reviewed  I have done?  prescreen  ?history and PE are consistent with findings on prescreen  I have reviewed case with Tasha Chan NP  Medical management by Dr Alireza WOOD completed by Dr Lay- I have reviewed and agree  PT,OT,ST,RT evaluations ordered and in progress  Med Reconciliation completed and reviewed in EHR  Patient remains appropriate for, in need of, should tolerate and benefit from IRF  ?